# Patient Record
Sex: FEMALE | Race: WHITE | HISPANIC OR LATINO | Employment: UNEMPLOYED | URBAN - METROPOLITAN AREA
[De-identification: names, ages, dates, MRNs, and addresses within clinical notes are randomized per-mention and may not be internally consistent; named-entity substitution may affect disease eponyms.]

---

## 2022-08-05 ENCOUNTER — HOSPITAL ENCOUNTER (EMERGENCY)
Facility: MEDICAL CENTER | Age: 52
End: 2022-08-06
Attending: EMERGENCY MEDICINE
Payer: COMMERCIAL

## 2022-08-05 DIAGNOSIS — F10.920 ALCOHOLIC INTOXICATION WITHOUT COMPLICATION (HCC): Primary | ICD-10-CM

## 2022-08-05 PROCEDURE — 99284 EMERGENCY DEPT VISIT MOD MDM: CPT

## 2022-08-06 VITALS
HEIGHT: 66 IN | HEART RATE: 91 BPM | DIASTOLIC BLOOD PRESSURE: 59 MMHG | OXYGEN SATURATION: 93 % | BODY MASS INDEX: 20.09 KG/M2 | SYSTOLIC BLOOD PRESSURE: 113 MMHG | RESPIRATION RATE: 20 BRPM | WEIGHT: 125 LBS | TEMPERATURE: 96.5 F

## 2022-08-06 ASSESSMENT — ENCOUNTER SYMPTOMS
ABDOMINAL PAIN: 0
HEADACHES: 0
NAUSEA: 1
FALLS: 1
LOSS OF CONSCIOUSNESS: 0

## 2022-08-06 ASSESSMENT — LIFESTYLE VARIABLES: SUBSTANCE_ABUSE: 1

## 2022-08-06 NOTE — ED TRIAGE NOTES
"Liana Gates  51 y.o. female  Chief Complaint   Patient presents with   • Fall   • Alcohol Intoxication     Pt came to the ER via [facility] from [facility] c/o fall and +etoh. EMS stated that pt has been drinking straight shots of vodka and tequila. Per EMS, pt stumbled and hit her head on the railing. Denies LOC and denies any blood thinner use. Pt has known hx of stroke with right sided weakness as per baseline. Pt also has hx of seizure and has been taking keppra; last seizure was \"a long time ago\" per pt. Pt was c/o nausea; given zofran 4mg IV via EMS. Blood sugar obtained at 168 prior to arrival.       Pt BIB EMS for above complaint.    Pt noted to be intoxicated at this time. Pt responds to verbal commands. Resp are even and unlabored. Patient denies pain.     Pt educated on triage process. Pt encouraged to alert staff for any changes.     This RN masked and in appropriate PPE during encounter.     Vitals:    08/05/22 2358   BP: (!) 154/72   Pulse: 61   Resp: 20   Temp: (!) 35.8 °C (96.4 °F)   SpO2: 93%     "

## 2022-08-06 NOTE — ED NOTES
Rounded on patient. Patient remains asleep in [facility]. No signs of apparent distress noted. Equal chest rise and fall with non-labored breathing. No further needs at this time. Call light within reach.

## 2022-08-06 NOTE — ED NOTES
Pt sleeping at this time. Noted non-labored breathing with equal rise and fall of chest wall. VS stable in the monitor. Call light within reach.

## 2022-08-06 NOTE — ED NOTES
Rounded on patient. Patient sleeping in [facility]. No signs of distress noted. Breathing is non-labored with equal chest rise and fall. No further needs at this time. Call light within reach.

## 2022-08-06 NOTE — ED NOTES
Pt sleeping. No apparent acute distress noted. Breathing is non-labored with equal rise and fall of chest wall. VS stable in the monitor. Pt given warm blanket for comfort. Call light within reach.

## 2022-08-06 NOTE — ED PROVIDER NOTES
"ED Provider Note    Scribed for Tomas Alexandra II, M* by Ute Carroll. 8/6/2022  12:02 AM    Means of Arrival: EMS  History obtained by: patient and triage note  Limitations: limited response    CHIEF COMPLAINT  Chief Complaint   Patient presents with   • Fall   • Alcohol Intoxication     Pt came to the ER via [facility] from [facility] c/o fall and +etoh. EMS stated that pt has been drinking straight shots of vodka and tequila. Per EMS, pt stumbled and hit her head on the railing. Denies LOC and denies any blood thinner use. Pt has known hx of stroke with right sided weakness as per baseline. Pt also has hx of seizure and has been taking keppra; last seizure was \"a long time ago\" per pt. Pt was c/o nausea; given zofran 4mg IV via EMS. Blood sugar obtained at 168 prior to arrival.       EMMETT Gates is a 51 y.o. female with history of RA, Sjogren's disease, TIA who presents to the Emergency Department for evaluation of injuries following a ground level fall onset tonight. Tonight Liana drank multiple vodka and tequila shots. She had a ground level fall and she hit her head on a railing. She did not have any loss of consciousness. Upon EMS arrival Liana was nauseous and she was given 4 mg of Zofran. She denies headaches or abdominal pain. No alleviating factors were reported. She has a history of a stroke with chronic right sided weakness. She has a history of a stroke but does take daily blood thinners. She also has a history of seizures and has been taking her Keppra. She has a history of RA, hashimoto's, and sjogren's syndrome.    Further history of present illness cannot be obtained due to the patient's limited responses    REVIEW OF SYSTEMS  Review of Systems   Gastrointestinal: Positive for nausea. Negative for abdominal pain.   Musculoskeletal: Positive for falls.   Neurological: Negative for loss of consciousness and headaches.   Psychiatric/Behavioral: Positive for substance abuse. Hallucinations: alcohol. " "    Further ROS cannot be obtained due to the patient's limited responses   See HPI for further details.    PAST MEDICAL HISTORY   Seizures and stroke    SOCIAL HISTORY  Social History     Tobacco Use   • Smoking status: Not reported   • Smokeless tobacco: Not reported   Substance and Sexual Activity   • Alcohol use: Not reported   • Drug use: Not reported   • Sexual activity: Not reported     SURGICAL HISTORY  patient denies any surgical history    CURRENT MEDICATIONS  Home Medications     Reviewed by Varsha Eden R.N. (Registered Nurse) on 08/06/22 at 0000  Med List Status: Partial   Medication Last Dose Status        Patient Adilson Taking any Medications                       ALLERGIES  Allergies   Allergen Reactions   • Other Drug      DARVOCET       PHYSICAL EXAM  VITAL SIGNS: BP (!) 154/72   Pulse 61   Temp (!) 35.8 °C (96.4 °F) (Temporal)   Resp 20   Ht 1.676 m (5' 6\")   Wt 56.7 kg (125 lb)   SpO2 93%   BMI 20.18 kg/m²     Constitutional: Middle aged woman sleeping  HENT: Normocephalic, Atraumatic, Bilateral external ears normal. Nose normal.   Neck: Supple, no stridor.   Eyes: Pupils are equal. Conjunctiva normal, non-icteric.   Heart: Regular rate and rhythm, no murmurs.    Lungs: Normal respiratory effort Clear to auscultation bilaterally.  Abdomen: Normal appearance, nondistended, nontender.  Skin: Warm, Dry, No erythema, No rash. No open wounds.   Neurologic: Sleeping but awakes briefly to voice, follows commansds, trunkal ataxia  MSK: moves all extremities spontaneously  Psychiatric:  Unable to assess since patient is sleeping    COURSE & MEDICAL DECISION MAKING  Pertinent Labs & Imaging studies reviewed. (See chart for details)    12:02 AM This is a 51 y.o. female who presents who has a history of alcohol intoxication. Exam is consistent with this today. She has no obvious signs of trauma. She is not complaining of a headache.     2:46 AM Patient was reevaluated at bedside. Patient is sleeping " she awakens to voice. She states she is from [place] and someone can pick her up.    5:02 AM Patient is ambulating with a steady gait. No signs of ataxia. Patient was evaluated at this time. Patient informed me that since her stroke in January she has been having frequent falls and headaches. Headache now is light, dull nonspecific. Not worst headache ever. No signs of trauma to her head. She remembers falling and denies significant head trauma.  Will discharge at this time. Patient verbalizes understanding and agreement to this plan of care.      The patient will return for worsening symptoms and is stable at the time of discharge. The patient verbalizes understanding and will comply. Guidance provided on appropriate use of medications.    DISPOSITION:  Patient will be discharged home in stable condition.    FOLLOW UP:  [facility], Emergency Dept  [address] [place] [id number] [phone]    If symptoms worsen, worst headache of your life, can't stop vomiting      FINAL IMPRESSION  1. Alcoholic intoxication without complication (HCC) Active         Ute BEDOLLA (Emibe), am scribing for, and in the presence of, VIDA Weaver II.    Electronically signed by: Ute Carroll (Mukesh), 8/6/2022    Tomas BEDOLLA II, M* personally performed the services described in this documentation, as scribed by Ute Carroll in my presence, and it is both accurate and complete.    The note accurately reflects work and decisions made by me.  Tomas Alexandra II, M.D.  8/6/2022  6:59 AM

## 2022-08-06 NOTE — ED NOTES
Pt sleeping but easily woken up with verbal stimuli. Pt denies any new complaints at this time. Pt is in no apparent distress. Breathing is non-labored with equal rise and fall of chest wall. VS stable in the monitor.

## 2023-05-25 ENCOUNTER — NON-PROVIDER VISIT (OUTPATIENT)
Dept: URGENT CARE | Facility: PHYSICIAN GROUP | Age: 53
End: 2023-05-25

## 2023-05-25 DIAGNOSIS — Z02.1 PRE-EMPLOYMENT DRUG SCREENING: ICD-10-CM

## 2023-05-25 LAB
AMP AMPHETAMINE: NORMAL
BAR BARBITURATES: NORMAL
BZO BENZODIAZEPINES: NORMAL
COC COCAINE: NORMAL
INT CON NEG: NORMAL
INT CON POS: NORMAL
MDMA ECSTASY: NORMAL
MET METHAMPHETAMINES: NORMAL
MTD METHADONE: NORMAL
OPI OPIATES: NORMAL
OXY OXYCODONE: NORMAL
PCP PHENCYCLIDINE: NORMAL
POC URINE DRUG SCREEN OCDRS: NEGATIVE
THC: NORMAL

## 2023-05-25 PROCEDURE — 80305 DRUG TEST PRSMV DIR OPT OBS: CPT | Performed by: NURSE PRACTITIONER

## 2023-05-25 NOTE — PROGRESS NOTES
Octavia Gates is a 52 y.o. female here for a non-provider visit for PP UDS     If abnormal was an in office provider notified today (if so, indicate provider)? No    Routed to PCP? No

## 2023-07-06 ENCOUNTER — HOSPITAL ENCOUNTER (OUTPATIENT)
Dept: LAB | Facility: MEDICAL CENTER | Age: 53
End: 2023-07-06
Attending: INTERNAL MEDICINE
Payer: COMMERCIAL

## 2023-07-06 ENCOUNTER — HOSPITAL ENCOUNTER (OUTPATIENT)
Dept: LAB | Facility: MEDICAL CENTER | Age: 53
End: 2023-07-06
Payer: COMMERCIAL

## 2023-07-06 LAB
25(OH)D3 SERPL-MCNC: 40 NG/ML (ref 30–100)
25(OH)D3 SERPL-MCNC: 41 NG/ML (ref 30–100)
ALBUMIN SERPL BCP-MCNC: 4.4 G/DL (ref 3.2–4.9)
ALBUMIN SERPL BCP-MCNC: 4.4 G/DL (ref 3.2–4.9)
ALP SERPL-CCNC: 127 U/L (ref 30–99)
ALT SERPL-CCNC: 33 U/L (ref 2–50)
ALT SERPL-CCNC: 34 U/L (ref 2–50)
AST SERPL-CCNC: 26 U/L (ref 12–45)
AST SERPL-CCNC: 27 U/L (ref 12–45)
BASOPHILS # BLD AUTO: 0.9 % (ref 0–1.8)
BASOPHILS # BLD: 0.08 K/UL (ref 0–0.12)
BILIRUB CONJ SERPL-MCNC: <0.2 MG/DL (ref 0.1–0.5)
BILIRUB INDIRECT SERPL-MCNC: ABNORMAL MG/DL (ref 0–1)
BILIRUB SERPL-MCNC: 0.4 MG/DL (ref 0.1–1.5)
CALCIUM SERPL-MCNC: 9.3 MG/DL (ref 8.5–10.5)
CREAT SERPL-MCNC: 0.58 MG/DL (ref 0.5–1.4)
CRP SERPL HS-MCNC: <0.3 MG/DL (ref 0–0.75)
EOSINOPHIL # BLD AUTO: 0.21 K/UL (ref 0–0.51)
EOSINOPHIL NFR BLD: 2.2 % (ref 0–6.9)
ERYTHROCYTE [DISTWIDTH] IN BLOOD BY AUTOMATED COUNT: 45.6 FL (ref 35.9–50)
GFR SERPLBLD CREATININE-BSD FMLA CKD-EPI: 110 ML/MIN/1.73 M 2
HCT VFR BLD AUTO: 43.8 % (ref 37–47)
HGB BLD-MCNC: 13.6 G/DL (ref 12–16)
IMM GRANULOCYTES # BLD AUTO: 0.04 K/UL (ref 0–0.11)
IMM GRANULOCYTES NFR BLD AUTO: 0.4 % (ref 0–0.9)
LYMPHOCYTES # BLD AUTO: 2.35 K/UL (ref 1–4.8)
LYMPHOCYTES NFR BLD: 25 % (ref 22–41)
MCH RBC QN AUTO: 27.5 PG (ref 27–33)
MCHC RBC AUTO-ENTMCNC: 31.1 G/DL (ref 32.2–35.5)
MCV RBC AUTO: 88.5 FL (ref 81.4–97.8)
MONOCYTES # BLD AUTO: 0.67 K/UL (ref 0–0.85)
MONOCYTES NFR BLD AUTO: 7.1 % (ref 0–13.4)
NEUTROPHILS # BLD AUTO: 6.04 K/UL (ref 1.82–7.42)
NEUTROPHILS NFR BLD: 64.4 % (ref 44–72)
NRBC # BLD AUTO: 0 K/UL
NRBC BLD-RTO: 0 /100 WBC (ref 0–0.2)
PHOSPHATE SERPL-MCNC: 3.9 MG/DL (ref 2.5–4.5)
PLATELET # BLD AUTO: 327 K/UL (ref 164–446)
PMV BLD AUTO: 12.3 FL (ref 9–12.9)
PROT SERPL-MCNC: 7.3 G/DL (ref 6–8.2)
PTH-INTACT SERPL-MCNC: 64.9 PG/ML (ref 14–72)
RBC # BLD AUTO: 4.95 M/UL (ref 4.2–5.4)
TSH SERPL DL<=0.005 MIU/L-ACNC: 0.7 UIU/ML (ref 0.38–5.33)
WBC # BLD AUTO: 9.4 K/UL (ref 4.8–10.8)

## 2023-07-06 PROCEDURE — 82306 VITAMIN D 25 HYDROXY: CPT

## 2023-07-06 PROCEDURE — 86480 TB TEST CELL IMMUN MEASURE: CPT

## 2023-07-06 PROCEDURE — 84443 ASSAY THYROID STIM HORMONE: CPT

## 2023-07-06 PROCEDURE — 84100 ASSAY OF PHOSPHORUS: CPT

## 2023-07-06 PROCEDURE — 83970 ASSAY OF PARATHORMONE: CPT

## 2023-07-06 PROCEDURE — 80076 HEPATIC FUNCTION PANEL: CPT

## 2023-07-06 PROCEDURE — 82565 ASSAY OF CREATININE: CPT

## 2023-07-06 PROCEDURE — 84460 ALANINE AMINO (ALT) (SGPT): CPT

## 2023-07-06 PROCEDURE — 36415 COLL VENOUS BLD VENIPUNCTURE: CPT

## 2023-07-06 PROCEDURE — 84075 ASSAY ALKALINE PHOSPHATASE: CPT | Mod: XU

## 2023-07-06 PROCEDURE — 84080 ASSAY ALKALINE PHOSPHATASES: CPT

## 2023-07-06 PROCEDURE — 84450 TRANSFERASE (AST) (SGOT): CPT

## 2023-07-06 PROCEDURE — 82040 ASSAY OF SERUM ALBUMIN: CPT

## 2023-07-06 PROCEDURE — 86140 C-REACTIVE PROTEIN: CPT

## 2023-07-06 PROCEDURE — 85652 RBC SED RATE AUTOMATED: CPT

## 2023-07-06 PROCEDURE — 82310 ASSAY OF CALCIUM: CPT

## 2023-07-06 PROCEDURE — 85025 COMPLETE CBC W/AUTO DIFF WBC: CPT

## 2023-07-07 LAB
ERYTHROCYTE [SEDIMENTATION RATE] IN BLOOD BY WESTERGREN METHOD: 2 MM/HOUR (ref 0–25)
GAMMA INTERFERON BACKGROUND BLD IA-ACNC: 0.09 IU/ML
M TB IFN-G BLD-IMP: NEGATIVE
M TB IFN-G CD4+ BCKGRND COR BLD-ACNC: -0.01 IU/ML
MITOGEN IGNF BCKGRD COR BLD-ACNC: >10 IU/ML
QFT TB2 - NIL TBQ2: 0 IU/ML

## 2023-07-11 LAB
ALP BONE SERPL-CCNC: 48 U/L (ref 0–55)
ALP ISOS SERPL HS-CCNC: 0 U/L
ALP LIVER SERPL-CCNC: 82 U/L (ref 0–94)
ALP SERPL-CCNC: 130 U/L (ref 40–120)

## 2023-08-26 ENCOUNTER — HOSPITAL ENCOUNTER (OUTPATIENT)
Dept: LAB | Facility: MEDICAL CENTER | Age: 53
End: 2023-08-26
Attending: INTERNAL MEDICINE
Payer: COMMERCIAL

## 2023-08-26 LAB
ANION GAP SERPL CALC-SCNC: 9 MMOL/L (ref 7–16)
BUN SERPL-MCNC: 16 MG/DL (ref 8–22)
CALCIUM SERPL-MCNC: 8.8 MG/DL (ref 8.5–10.5)
CHLORIDE SERPL-SCNC: 102 MMOL/L (ref 96–112)
CO2 SERPL-SCNC: 28 MMOL/L (ref 20–33)
CREAT SERPL-MCNC: 0.56 MG/DL (ref 0.5–1.4)
GFR SERPLBLD CREATININE-BSD FMLA CKD-EPI: 111 ML/MIN/1.73 M 2
GLUCOSE SERPL-MCNC: 80 MG/DL (ref 65–99)
POTASSIUM SERPL-SCNC: 5 MMOL/L (ref 3.6–5.5)
SODIUM SERPL-SCNC: 139 MMOL/L (ref 135–145)
T3 SERPL-MCNC: 129 NG/DL (ref 60–181)
T4 FREE SERPL-MCNC: 1.3 NG/DL (ref 0.93–1.7)
TSH SERPL DL<=0.005 MIU/L-ACNC: 0.28 UIU/ML (ref 0.38–5.33)

## 2023-08-26 PROCEDURE — 84480 ASSAY TRIIODOTHYRONINE (T3): CPT

## 2023-08-26 PROCEDURE — 84439 ASSAY OF FREE THYROXINE: CPT

## 2023-08-26 PROCEDURE — 84443 ASSAY THYROID STIM HORMONE: CPT

## 2023-08-26 PROCEDURE — 82340 ASSAY OF CALCIUM IN URINE: CPT

## 2023-08-26 PROCEDURE — 36415 COLL VENOUS BLD VENIPUNCTURE: CPT

## 2023-08-26 PROCEDURE — 80048 BASIC METABOLIC PNL TOTAL CA: CPT

## 2023-08-30 LAB
CALCIUM 24H UR-MCNC: 1.2 MG/DL
CALCIUM 24H UR-MRATE: ABNORMAL MG/D (ref 100–250)
CALCIUM/CREAT 24H UR: 17 MG/G (ref 20–300)
COLLECT DURATION TIME SPEC: ABNORMAL HRS
CREAT 24H UR-MCNC: 72 MG/DL
CREAT 24H UR-MRATE: ABNORMAL MG/D (ref 700–1600)
SPECIMEN VOL ?TM UR: ABNORMAL ML